# Patient Record
Sex: FEMALE | Race: WHITE | Employment: OTHER | URBAN - METROPOLITAN AREA
[De-identification: names, ages, dates, MRNs, and addresses within clinical notes are randomized per-mention and may not be internally consistent; named-entity substitution may affect disease eponyms.]

---

## 2020-01-01 ENCOUNTER — APPOINTMENT (OUTPATIENT)
Dept: CT IMAGING | Age: 85
DRG: 064 | End: 2020-01-01
Payer: MEDICARE

## 2020-01-01 ENCOUNTER — HOSPITAL ENCOUNTER (INPATIENT)
Age: 85
LOS: 3 days | Discharge: HOSPICE | DRG: 064 | End: 2020-02-22
Attending: EMERGENCY MEDICINE | Admitting: INTERNAL MEDICINE
Payer: MEDICARE

## 2020-01-01 ENCOUNTER — APPOINTMENT (OUTPATIENT)
Dept: GENERAL RADIOLOGY | Age: 85
DRG: 064 | End: 2020-01-01
Payer: MEDICARE

## 2020-01-01 VITALS
DIASTOLIC BLOOD PRESSURE: 74 MMHG | HEART RATE: 146 BPM | SYSTOLIC BLOOD PRESSURE: 106 MMHG | WEIGHT: 143.7 LBS | TEMPERATURE: 97.8 F | HEIGHT: 60 IN | OXYGEN SATURATION: 74 % | BODY MASS INDEX: 28.21 KG/M2 | RESPIRATION RATE: 28 BRPM

## 2020-01-01 LAB
A/G RATIO: 1.3 (ref 1.1–2.2)
ALBUMIN SERPL-MCNC: 4 G/DL (ref 3.4–5)
ALP BLD-CCNC: 105 U/L (ref 40–129)
ALT SERPL-CCNC: 33 U/L (ref 10–40)
ANION GAP SERPL CALCULATED.3IONS-SCNC: 15 MMOL/L (ref 3–16)
ANION GAP SERPL CALCULATED.3IONS-SCNC: 16 MMOL/L (ref 3–16)
ANION GAP SERPL CALCULATED.3IONS-SCNC: 29 MMOL/L (ref 3–16)
AST SERPL-CCNC: 80 U/L (ref 15–37)
BASOPHILS ABSOLUTE: 0 K/UL (ref 0–0.2)
BASOPHILS RELATIVE PERCENT: 0 %
BILIRUB SERPL-MCNC: 0.9 MG/DL (ref 0–1)
BILIRUBIN URINE: ABNORMAL
BLOOD CULTURE, ROUTINE: NORMAL
BLOOD, URINE: ABNORMAL
BUN BLDV-MCNC: 34 MG/DL (ref 7–20)
BUN BLDV-MCNC: 42 MG/DL (ref 7–20)
BUN BLDV-MCNC: 58 MG/DL (ref 7–20)
CALCIUM SERPL-MCNC: 8.1 MG/DL (ref 8.3–10.6)
CALCIUM SERPL-MCNC: 8.1 MG/DL (ref 8.3–10.6)
CALCIUM SERPL-MCNC: 9.8 MG/DL (ref 8.3–10.6)
CHLORIDE BLD-SCNC: 104 MMOL/L (ref 99–110)
CHLORIDE BLD-SCNC: 106 MMOL/L (ref 99–110)
CHLORIDE BLD-SCNC: 98 MMOL/L (ref 99–110)
CHOLESTEROL, TOTAL: 116 MG/DL (ref 0–199)
CLARITY: ABNORMAL
CO2: 15 MMOL/L (ref 21–32)
CO2: 17 MMOL/L (ref 21–32)
CO2: 19 MMOL/L (ref 21–32)
COLOR: ABNORMAL
CREAT SERPL-MCNC: 2 MG/DL (ref 0.6–1.2)
CREAT SERPL-MCNC: 2.6 MG/DL (ref 0.6–1.2)
CREAT SERPL-MCNC: 2.8 MG/DL (ref 0.6–1.2)
EKG ATRIAL RATE: 136 BPM
EKG DIAGNOSIS: NORMAL
EKG Q-T INTERVAL: 408 MS
EKG QRS DURATION: 92 MS
EKG QTC CALCULATION (BAZETT): 614 MS
EKG R AXIS: 87 DEGREES
EKG T AXIS: 170 DEGREES
EKG VENTRICULAR RATE: 136 BPM
EOSINOPHILS ABSOLUTE: 0 K/UL (ref 0–0.6)
EOSINOPHILS RELATIVE PERCENT: 0 %
EPITHELIAL CELLS, UA: ABNORMAL /HPF (ref 0–5)
GFR AFRICAN AMERICAN: 19
GFR AFRICAN AMERICAN: 21
GFR AFRICAN AMERICAN: 28
GFR NON-AFRICAN AMERICAN: 16
GFR NON-AFRICAN AMERICAN: 17
GFR NON-AFRICAN AMERICAN: 23
GLOBULIN: 3.2 G/DL
GLUCOSE BLD-MCNC: 124 MG/DL (ref 70–99)
GLUCOSE BLD-MCNC: 147 MG/DL (ref 70–99)
GLUCOSE BLD-MCNC: 149 MG/DL
GLUCOSE BLD-MCNC: 149 MG/DL (ref 70–99)
GLUCOSE BLD-MCNC: 153 MG/DL (ref 70–99)
GLUCOSE BLD-MCNC: 170 MG/DL (ref 70–99)
GLUCOSE BLD-MCNC: 173 MG/DL (ref 70–99)
GLUCOSE BLD-MCNC: 187 MG/DL (ref 70–99)
GLUCOSE BLD-MCNC: 96 MG/DL (ref 70–99)
GLUCOSE URINE: 100 MG/DL
HCT VFR BLD CALC: 39.1 % (ref 36–48)
HCT VFR BLD CALC: 44.8 % (ref 36–48)
HDLC SERPL-MCNC: 54 MG/DL (ref 40–60)
HEMOGLOBIN: 12.1 G/DL (ref 12–16)
HEMOGLOBIN: 12.8 G/DL (ref 12–16)
KETONES, URINE: 15 MG/DL
LACTIC ACID, SEPSIS: 10.4 MMOL/L (ref 0.4–1.9)
LACTIC ACID, SEPSIS: 9.6 MMOL/L (ref 0.4–1.9)
LDL CHOLESTEROL CALCULATED: 48 MG/DL
LEUKOCYTE ESTERASE, URINE: NEGATIVE
LYMPHOCYTES ABSOLUTE: 0.6 K/UL (ref 1–5.1)
LYMPHOCYTES RELATIVE PERCENT: 4.7 %
MCH RBC QN AUTO: 29.9 PG (ref 26–34)
MCH RBC QN AUTO: 30 PG (ref 26–34)
MCHC RBC AUTO-ENTMCNC: 28.6 G/DL (ref 31–36)
MCHC RBC AUTO-ENTMCNC: 30.9 G/DL (ref 31–36)
MCV RBC AUTO: 105 FL (ref 80–100)
MCV RBC AUTO: 96.6 FL (ref 80–100)
MICROSCOPIC EXAMINATION: YES
MONOCYTES ABSOLUTE: 0.9 K/UL (ref 0–1.3)
MONOCYTES RELATIVE PERCENT: 6.8 %
NEUTROPHILS ABSOLUTE: 11.4 K/UL (ref 1.7–7.7)
NEUTROPHILS RELATIVE PERCENT: 88.5 %
NITRITE, URINE: NEGATIVE
PDW BLD-RTO: 18.5 % (ref 12.4–15.4)
PDW BLD-RTO: 19.6 % (ref 12.4–15.4)
PERFORMED ON: ABNORMAL
PH UA: 5.5 (ref 5–8)
PLATELET # BLD: 209 K/UL (ref 135–450)
PLATELET # BLD: 274 K/UL (ref 135–450)
PMV BLD AUTO: 8.2 FL (ref 5–10.5)
PMV BLD AUTO: 8.2 FL (ref 5–10.5)
POTASSIUM REFLEX MAGNESIUM: 5.4 MMOL/L (ref 3.5–5.1)
POTASSIUM REFLEX MAGNESIUM: 6.2 MMOL/L (ref 3.5–5.1)
POTASSIUM SERPL-SCNC: 5.1 MMOL/L (ref 3.5–5.1)
PROTEIN UA: >=300 MG/DL
RBC # BLD: 4.05 M/UL (ref 4–5.2)
RBC # BLD: 4.26 M/UL (ref 4–5.2)
RBC UA: >100 /HPF (ref 0–4)
REPORT: NORMAL
SODIUM BLD-SCNC: 138 MMOL/L (ref 136–145)
SODIUM BLD-SCNC: 139 MMOL/L (ref 136–145)
SODIUM BLD-SCNC: 142 MMOL/L (ref 136–145)
SPECIFIC GRAVITY UA: >=1.03 (ref 1–1.03)
TOTAL PROTEIN: 7.2 G/DL (ref 6.4–8.2)
TRIGL SERPL-MCNC: 72 MG/DL (ref 0–150)
URINE CULTURE, ROUTINE: NORMAL
URINE REFLEX TO CULTURE: YES
URINE TYPE: ABNORMAL
UROBILINOGEN, URINE: 1 E.U./DL
VLDLC SERPL CALC-MCNC: 14 MG/DL
WBC # BLD: 12.8 K/UL (ref 4–11)
WBC # BLD: 14.7 K/UL (ref 4–11)
WBC UA: ABNORMAL /HPF (ref 0–5)

## 2020-01-01 PROCEDURE — 81001 URINALYSIS AUTO W/SCOPE: CPT

## 2020-01-01 PROCEDURE — 93005 ELECTROCARDIOGRAM TRACING: CPT | Performed by: EMERGENCY MEDICINE

## 2020-01-01 PROCEDURE — 96361 HYDRATE IV INFUSION ADD-ON: CPT

## 2020-01-01 PROCEDURE — 1200000000 HC SEMI PRIVATE

## 2020-01-01 PROCEDURE — 6370000000 HC RX 637 (ALT 250 FOR IP): Performed by: EMERGENCY MEDICINE

## 2020-01-01 PROCEDURE — 87150 DNA/RNA AMPLIFIED PROBE: CPT

## 2020-01-01 PROCEDURE — 51702 INSERT TEMP BLADDER CATH: CPT

## 2020-01-01 PROCEDURE — 36415 COLL VENOUS BLD VENIPUNCTURE: CPT

## 2020-01-01 PROCEDURE — 2700000000 HC OXYGEN THERAPY PER DAY

## 2020-01-01 PROCEDURE — 1250000000 HC SEMI PRIVATE HOSPICE R&B

## 2020-01-01 PROCEDURE — 6370000000 HC RX 637 (ALT 250 FOR IP): Performed by: INTERNAL MEDICINE

## 2020-01-01 PROCEDURE — 2580000003 HC RX 258: Performed by: INTERNAL MEDICINE

## 2020-01-01 PROCEDURE — 87040 BLOOD CULTURE FOR BACTERIA: CPT

## 2020-01-01 PROCEDURE — 96365 THER/PROPH/DIAG IV INF INIT: CPT

## 2020-01-01 PROCEDURE — 6360000002 HC RX W HCPCS: Performed by: INTERNAL MEDICINE

## 2020-01-01 PROCEDURE — 80048 BASIC METABOLIC PNL TOTAL CA: CPT

## 2020-01-01 PROCEDURE — 6360000002 HC RX W HCPCS: Performed by: EMERGENCY MEDICINE

## 2020-01-01 PROCEDURE — 94761 N-INVAS EAR/PLS OXIMETRY MLT: CPT

## 2020-01-01 PROCEDURE — 99291 CRITICAL CARE FIRST HOUR: CPT

## 2020-01-01 PROCEDURE — 93010 ELECTROCARDIOGRAM REPORT: CPT | Performed by: INTERNAL MEDICINE

## 2020-01-01 PROCEDURE — 2580000003 HC RX 258: Performed by: EMERGENCY MEDICINE

## 2020-01-01 PROCEDURE — 70450 CT HEAD/BRAIN W/O DYE: CPT

## 2020-01-01 PROCEDURE — 96375 TX/PRO/DX INJ NEW DRUG ADDON: CPT

## 2020-01-01 PROCEDURE — 99223 1ST HOSP IP/OBS HIGH 75: CPT | Performed by: PSYCHIATRY & NEUROLOGY

## 2020-01-01 PROCEDURE — 80053 COMPREHEN METABOLIC PANEL: CPT

## 2020-01-01 PROCEDURE — 71250 CT THORAX DX C-: CPT

## 2020-01-01 PROCEDURE — 85025 COMPLETE CBC W/AUTO DIFF WBC: CPT

## 2020-01-01 PROCEDURE — 83605 ASSAY OF LACTIC ACID: CPT

## 2020-01-01 PROCEDURE — 99292 CRITICAL CARE ADDL 30 MIN: CPT

## 2020-01-01 PROCEDURE — 85027 COMPLETE CBC AUTOMATED: CPT

## 2020-01-01 PROCEDURE — 71045 X-RAY EXAM CHEST 1 VIEW: CPT

## 2020-01-01 PROCEDURE — 80061 LIPID PANEL: CPT

## 2020-01-01 PROCEDURE — 87086 URINE CULTURE/COLONY COUNT: CPT

## 2020-01-01 PROCEDURE — 96367 TX/PROPH/DG ADDL SEQ IV INF: CPT

## 2020-01-01 RX ORDER — ATORVASTATIN CALCIUM 40 MG/1
40 TABLET, FILM COATED ORAL NIGHTLY
Status: DISCONTINUED | OUTPATIENT
Start: 2020-01-01 | End: 2020-01-01

## 2020-01-01 RX ORDER — LORAZEPAM 2 MG/ML
0.5 CONCENTRATE ORAL EVERY 4 HOURS PRN
Status: DISCONTINUED | OUTPATIENT
Start: 2020-01-01 | End: 2020-01-01

## 2020-01-01 RX ORDER — CETIRIZINE HYDROCHLORIDE 10 MG/1
10 TABLET ORAL DAILY
Status: ON HOLD | COMMUNITY
End: 2020-01-01 | Stop reason: HOSPADM

## 2020-01-01 RX ORDER — ONDANSETRON 4 MG/1
4 TABLET, ORALLY DISINTEGRATING ORAL ONCE
Status: DISCONTINUED | OUTPATIENT
Start: 2020-01-01 | End: 2020-01-01 | Stop reason: HOSPADM

## 2020-01-01 RX ORDER — ACETAMINOPHEN 500 MG
500 TABLET ORAL EVERY 6 HOURS PRN
Status: ON HOLD | COMMUNITY
End: 2020-01-01 | Stop reason: HOSPADM

## 2020-01-01 RX ORDER — GABAPENTIN 300 MG/1
300 CAPSULE ORAL 3 TIMES DAILY
Status: ON HOLD | COMMUNITY
End: 2020-01-01 | Stop reason: HOSPADM

## 2020-01-01 RX ORDER — ESCITALOPRAM OXALATE 10 MG/1
10 TABLET ORAL DAILY
Status: ON HOLD | COMMUNITY
End: 2020-01-01 | Stop reason: HOSPADM

## 2020-01-01 RX ORDER — SODIUM CHLORIDE 0.9 % (FLUSH) 0.9 %
10 SYRINGE (ML) INJECTION PRN
Status: DISCONTINUED | OUTPATIENT
Start: 2020-01-01 | End: 2020-01-01

## 2020-01-01 RX ORDER — LABETALOL HYDROCHLORIDE 5 MG/ML
10 INJECTION, SOLUTION INTRAVENOUS EVERY 10 MIN PRN
Status: DISCONTINUED | OUTPATIENT
Start: 2020-01-01 | End: 2020-01-01

## 2020-01-01 RX ORDER — SENNA PLUS 8.6 MG/1
1 TABLET ORAL 2 TIMES DAILY PRN
Status: ON HOLD | COMMUNITY
End: 2020-01-01 | Stop reason: HOSPADM

## 2020-01-01 RX ORDER — 0.9 % SODIUM CHLORIDE 0.9 %
500 INTRAVENOUS SOLUTION INTRAVENOUS ONCE
Status: COMPLETED | OUTPATIENT
Start: 2020-01-01 | End: 2020-01-01

## 2020-01-01 RX ORDER — 0.9 % SODIUM CHLORIDE 0.9 %
1000 INTRAVENOUS SOLUTION INTRAVENOUS ONCE
Status: COMPLETED | OUTPATIENT
Start: 2020-01-01 | End: 2020-01-01

## 2020-01-01 RX ORDER — ESCITALOPRAM OXALATE 10 MG/1
10 TABLET ORAL DAILY
Status: DISCONTINUED | OUTPATIENT
Start: 2020-01-01 | End: 2020-01-01

## 2020-01-01 RX ORDER — DEXTROSE MONOHYDRATE 25 G/50ML
25 INJECTION, SOLUTION INTRAVENOUS ONCE
Status: COMPLETED | OUTPATIENT
Start: 2020-01-01 | End: 2020-01-01

## 2020-01-01 RX ORDER — MAGNESIUM HYDROXIDE/ALUMINUM HYDROXICE/SIMETHICONE 120; 1200; 1200 MG/30ML; MG/30ML; MG/30ML
30 SUSPENSION ORAL EVERY 4 HOURS PRN
Status: ON HOLD | COMMUNITY
End: 2020-01-01 | Stop reason: HOSPADM

## 2020-01-01 RX ORDER — TRAZODONE HYDROCHLORIDE 50 MG/1
50 TABLET ORAL NIGHTLY
Status: DISCONTINUED | OUTPATIENT
Start: 2020-01-01 | End: 2020-01-01

## 2020-01-01 RX ORDER — MAGNESIUM HYDROXIDE/ALUMINUM HYDROXICE/SIMETHICONE 120; 1200; 1200 MG/30ML; MG/30ML; MG/30ML
30 SUSPENSION ORAL EVERY 4 HOURS PRN
Status: DISCONTINUED | OUTPATIENT
Start: 2020-01-01 | End: 2020-01-01

## 2020-01-01 RX ORDER — CETIRIZINE HYDROCHLORIDE 10 MG/1
10 TABLET ORAL DAILY
Status: DISCONTINUED | OUTPATIENT
Start: 2020-01-01 | End: 2020-01-01

## 2020-01-01 RX ORDER — SODIUM CHLORIDE 0.9 % (FLUSH) 0.9 %
10 SYRINGE (ML) INJECTION EVERY 12 HOURS SCHEDULED
Status: DISCONTINUED | OUTPATIENT
Start: 2020-01-01 | End: 2020-01-01

## 2020-01-01 RX ORDER — DEXTROSE AND SODIUM CHLORIDE 5; .45 G/100ML; G/100ML
INJECTION, SOLUTION INTRAVENOUS CONTINUOUS
Status: DISCONTINUED | OUTPATIENT
Start: 2020-01-01 | End: 2020-01-01

## 2020-01-01 RX ORDER — DEXTROSE MONOHYDRATE 25 G/50ML
12.5 INJECTION, SOLUTION INTRAVENOUS PRN
Status: DISCONTINUED | OUTPATIENT
Start: 2020-01-01 | End: 2020-01-01 | Stop reason: HOSPADM

## 2020-01-01 RX ORDER — ONDANSETRON 2 MG/ML
4 INJECTION INTRAMUSCULAR; INTRAVENOUS EVERY 6 HOURS PRN
Status: DISCONTINUED | OUTPATIENT
Start: 2020-01-01 | End: 2020-01-01 | Stop reason: HOSPADM

## 2020-01-01 RX ORDER — IBUPROFEN 200 MG
200 TABLET ORAL EVERY 4 HOURS PRN
Status: ON HOLD | COMMUNITY
End: 2020-01-01 | Stop reason: HOSPADM

## 2020-01-01 RX ORDER — IBUPROFEN 200 MG
200 TABLET ORAL EVERY 4 HOURS PRN
Status: DISCONTINUED | OUTPATIENT
Start: 2020-01-01 | End: 2020-01-01

## 2020-01-01 RX ORDER — MORPHINE SULFATE 20 MG/ML
10 SOLUTION ORAL
Status: DISCONTINUED | OUTPATIENT
Start: 2020-01-01 | End: 2020-01-01 | Stop reason: HOSPADM

## 2020-01-01 RX ORDER — ASPIRIN 81 MG/1
81 TABLET, CHEWABLE ORAL DAILY
Status: DISCONTINUED | OUTPATIENT
Start: 2020-01-01 | End: 2020-01-01

## 2020-01-01 RX ORDER — OXYCODONE AND ACETAMINOPHEN 10; 325 MG/1; MG/1
1 TABLET ORAL 3 TIMES DAILY
Status: ON HOLD | COMMUNITY
End: 2020-01-01 | Stop reason: HOSPADM

## 2020-01-01 RX ORDER — SENNA PLUS 8.6 MG/1
1 TABLET ORAL NIGHTLY
Status: DISCONTINUED | OUTPATIENT
Start: 2020-01-01 | End: 2020-01-01

## 2020-01-01 RX ORDER — ASPIRIN 81 MG/1
81 TABLET ORAL DAILY
Status: DISCONTINUED | OUTPATIENT
Start: 2020-01-01 | End: 2020-01-01 | Stop reason: ALTCHOICE

## 2020-01-01 RX ORDER — MIRTAZAPINE 7.5 MG/1
7.5 TABLET, FILM COATED ORAL NIGHTLY
Status: ON HOLD | COMMUNITY
End: 2020-01-01 | Stop reason: HOSPADM

## 2020-01-01 RX ORDER — ACETAMINOPHEN 500 MG
500 TABLET ORAL EVERY 6 HOURS PRN
Status: DISCONTINUED | OUTPATIENT
Start: 2020-01-01 | End: 2020-01-01

## 2020-01-01 RX ORDER — ASPIRIN 300 MG/1
300 SUPPOSITORY RECTAL DAILY
Status: DISCONTINUED | OUTPATIENT
Start: 2020-01-01 | End: 2020-01-01 | Stop reason: HOSPADM

## 2020-01-01 RX ORDER — PROMETHAZINE HYDROCHLORIDE 25 MG/1
12.5 TABLET ORAL EVERY 6 HOURS PRN
Status: DISCONTINUED | OUTPATIENT
Start: 2020-01-01 | End: 2020-01-01

## 2020-01-01 RX ORDER — OXYCODONE AND ACETAMINOPHEN 10; 325 MG/1; MG/1
0.5 TABLET ORAL 3 TIMES DAILY
Status: DISCONTINUED | OUTPATIENT
Start: 2020-01-01 | End: 2020-01-01

## 2020-01-01 RX ORDER — BISACODYL 10 MG
10 SUPPOSITORY, RECTAL RECTAL DAILY PRN
Status: DISCONTINUED | OUTPATIENT
Start: 2020-01-01 | End: 2020-01-01 | Stop reason: HOSPADM

## 2020-01-01 RX ORDER — FENTANYL 25 UG/H
1 PATCH TRANSDERMAL
Status: DISCONTINUED | OUTPATIENT
Start: 2020-01-01 | End: 2020-01-01

## 2020-01-01 RX ORDER — CLONAZEPAM 0.5 MG/1
0.5 TABLET ORAL NIGHTLY
Status: DISCONTINUED | OUTPATIENT
Start: 2020-01-01 | End: 2020-01-01

## 2020-01-01 RX ORDER — BUPROPION HYDROCHLORIDE 300 MG/1
300 TABLET ORAL EVERY MORNING
Status: DISCONTINUED | OUTPATIENT
Start: 2020-01-01 | End: 2020-01-01

## 2020-01-01 RX ORDER — SODIUM CHLORIDE 9 MG/ML
INJECTION, SOLUTION INTRAVENOUS CONTINUOUS
Status: DISCONTINUED | OUTPATIENT
Start: 2020-01-01 | End: 2020-01-01

## 2020-01-01 RX ORDER — POLYETHYLENE GLYCOL 3350 17 G/17G
17 POWDER, FOR SOLUTION ORAL DAILY PRN
Status: DISCONTINUED | OUTPATIENT
Start: 2020-01-01 | End: 2020-01-01

## 2020-01-01 RX ORDER — DEXTROSE MONOHYDRATE 50 MG/ML
100 INJECTION, SOLUTION INTRAVENOUS PRN
Status: DISCONTINUED | OUTPATIENT
Start: 2020-01-01 | End: 2020-01-01 | Stop reason: HOSPADM

## 2020-01-01 RX ORDER — MORPHINE SULFATE 20 MG/ML
10 SOLUTION ORAL
Qty: 30 ML | Refills: 0 | Status: SHIPPED | OUTPATIENT
Start: 2020-01-01 | End: 2020-02-25

## 2020-01-01 RX ORDER — FENTANYL 25 UG/H
1 PATCH TRANSDERMAL
Status: ON HOLD | COMMUNITY
End: 2020-01-01 | Stop reason: HOSPADM

## 2020-01-01 RX ORDER — BISACODYL 10 MG
10 SUPPOSITORY, RECTAL RECTAL DAILY PRN
COMMUNITY

## 2020-01-01 RX ORDER — MENTHOL AND ZINC OXIDE .44; 20.625 G/100G; G/100G
OINTMENT TOPICAL 2 TIMES DAILY PRN
Status: ON HOLD | COMMUNITY
End: 2020-01-01 | Stop reason: HOSPADM

## 2020-01-01 RX ORDER — LORAZEPAM 2 MG/ML
2 CONCENTRATE ORAL EVERY 4 HOURS PRN
Status: DISCONTINUED | OUTPATIENT
Start: 2020-01-01 | End: 2020-01-01 | Stop reason: HOSPADM

## 2020-01-01 RX ORDER — NICOTINE POLACRILEX 4 MG
15 LOZENGE BUCCAL PRN
Status: DISCONTINUED | OUTPATIENT
Start: 2020-01-01 | End: 2020-01-01 | Stop reason: HOSPADM

## 2020-01-01 RX ORDER — MIRTAZAPINE 15 MG/1
7.5 TABLET, FILM COATED ORAL NIGHTLY
Status: DISCONTINUED | OUTPATIENT
Start: 2020-01-01 | End: 2020-01-01

## 2020-01-01 RX ORDER — MORPHINE SULFATE 20 MG/ML
5 SOLUTION ORAL
Status: DISCONTINUED | OUTPATIENT
Start: 2020-01-01 | End: 2020-01-01

## 2020-01-01 RX ORDER — LORAZEPAM 2 MG/ML
2 CONCENTRATE ORAL EVERY 4 HOURS PRN
Qty: 30 ML | Refills: 0 | Status: SHIPPED | OUTPATIENT
Start: 2020-01-01 | End: 2020-03-07

## 2020-01-01 RX ADMIN — SODIUM CHLORIDE 500 ML: 9 INJECTION, SOLUTION INTRAVENOUS at 15:15

## 2020-01-01 RX ADMIN — Medication 2 MG: at 19:13

## 2020-01-01 RX ADMIN — SODIUM CHLORIDE 500 ML: 9 INJECTION, SOLUTION INTRAVENOUS at 17:37

## 2020-01-01 RX ADMIN — Medication 10 ML: at 21:50

## 2020-01-01 RX ADMIN — INSULIN HUMAN 10 UNITS: 100 INJECTION, SOLUTION PARENTERAL at 17:33

## 2020-01-01 RX ADMIN — DEXTROSE AND SODIUM CHLORIDE: 5; 450 INJECTION, SOLUTION INTRAVENOUS at 03:26

## 2020-01-01 RX ADMIN — PIPERACILLIN AND TAZOBACTAM 3.38 G: 3; .375 INJECTION, POWDER, LYOPHILIZED, FOR SOLUTION INTRAVENOUS at 21:48

## 2020-01-01 RX ADMIN — Medication 10 ML: at 08:41

## 2020-01-01 RX ADMIN — MORPHINE SULFATE 5 MG: 100 SOLUTION ORAL at 01:33

## 2020-01-01 RX ADMIN — PIPERACILLIN AND TAZOBACTAM 3.38 G: 3; .375 INJECTION, POWDER, LYOPHILIZED, FOR SOLUTION INTRAVENOUS at 08:57

## 2020-01-01 RX ADMIN — MORPHINE SULFATE 10 MG: 100 SOLUTION ORAL at 04:17

## 2020-01-01 RX ADMIN — DEXTROSE MONOHYDRATE 25 G: 500 INJECTION PARENTERAL at 17:32

## 2020-01-01 RX ADMIN — Medication 10 ML: at 21:48

## 2020-01-01 RX ADMIN — Medication 10 ML: at 23:33

## 2020-01-01 RX ADMIN — Medication 2 MG: at 23:39

## 2020-01-01 RX ADMIN — ASPIRIN 300 MG: 300 SUPPOSITORY RECTAL at 08:41

## 2020-01-01 RX ADMIN — SODIUM CHLORIDE 1000 ML: 9 INJECTION, SOLUTION INTRAVENOUS at 23:06

## 2020-01-01 RX ADMIN — TAZOBACTAM SODIUM AND PIPERACILLIN SODIUM 4.5 G: 500; 4 INJECTION, SOLUTION INTRAVENOUS at 11:30

## 2020-01-01 RX ADMIN — PIPERACILLIN AND TAZOBACTAM 3.38 G: 3; .375 INJECTION, POWDER, LYOPHILIZED, FOR SOLUTION INTRAVENOUS at 22:08

## 2020-01-01 RX ADMIN — MORPHINE SULFATE 10 MG: 100 SOLUTION ORAL at 15:02

## 2020-01-01 RX ADMIN — DEXTROSE AND SODIUM CHLORIDE: 5; 450 INJECTION, SOLUTION INTRAVENOUS at 12:11

## 2020-01-01 RX ADMIN — MORPHINE SULFATE 10 MG: 100 SOLUTION ORAL at 10:32

## 2020-01-01 RX ADMIN — MORPHINE SULFATE 10 MG: 100 SOLUTION ORAL at 22:23

## 2020-01-01 RX ADMIN — SODIUM CHLORIDE: 9 INJECTION, SOLUTION INTRAVENOUS at 09:25

## 2020-01-01 RX ADMIN — Medication 10 ML: at 23:34

## 2020-01-01 RX ADMIN — CEFEPIME HYDROCHLORIDE 2 G: 2 INJECTION, POWDER, FOR SOLUTION INTRAVENOUS at 16:15

## 2020-01-01 RX ADMIN — MORPHINE SULFATE 5 MG: 100 SOLUTION ORAL at 14:13

## 2020-01-01 RX ADMIN — SODIUM CHLORIDE: 9 INJECTION, SOLUTION INTRAVENOUS at 01:33

## 2020-01-01 RX ADMIN — MORPHINE SULFATE 5 MG: 100 SOLUTION ORAL at 21:09

## 2020-01-01 RX ADMIN — DEXTROSE AND SODIUM CHLORIDE: 5; 450 INJECTION, SOLUTION INTRAVENOUS at 05:07

## 2020-01-01 RX ADMIN — MORPHINE SULFATE 10 MG: 100 SOLUTION ORAL at 15:34

## 2020-01-01 RX ADMIN — Medication 2 MG: at 05:44

## 2020-01-01 RX ADMIN — Medication 10 ML: at 21:00

## 2020-01-01 RX ADMIN — DEXTROSE AND SODIUM CHLORIDE: 5; 450 INJECTION, SOLUTION INTRAVENOUS at 19:33

## 2020-01-01 RX ADMIN — MORPHINE SULFATE 5 MG: 100 SOLUTION ORAL at 07:04

## 2020-01-01 RX ADMIN — Medication 10 ML: at 09:20

## 2020-01-01 RX ADMIN — PIPERACILLIN AND TAZOBACTAM 3.38 G: 3; .375 INJECTION, POWDER, LYOPHILIZED, FOR SOLUTION INTRAVENOUS at 08:41

## 2020-01-01 RX ADMIN — TAZOBACTAM SODIUM AND PIPERACILLIN SODIUM 4.5 G: 500; 4 INJECTION, SOLUTION INTRAVENOUS at 23:07

## 2020-01-01 RX ADMIN — MORPHINE SULFATE 10 MG: 100 SOLUTION ORAL at 22:22

## 2020-01-01 RX ADMIN — DEXTROSE AND SODIUM CHLORIDE: 5; 450 INJECTION, SOLUTION INTRAVENOUS at 21:01

## 2020-01-01 RX ADMIN — ASPIRIN 300 MG: 300 SUPPOSITORY RECTAL at 09:20

## 2020-01-01 RX ADMIN — VANCOMYCIN HYDROCHLORIDE 750 MG: 750 INJECTION, POWDER, LYOPHILIZED, FOR SOLUTION INTRAVENOUS at 16:58

## 2020-01-01 RX ADMIN — SODIUM CHLORIDE 500 ML: 9 INJECTION, SOLUTION INTRAVENOUS at 14:25

## 2020-01-01 ASSESSMENT — PAIN SCALES - PAIN ASSESSMENT IN ADVANCED DEMENTIA (PAINAD)
BREATHING: 2
CONSOLABILITY: 0
CONSOLABILITY: 0
BODYLANGUAGE: 1
FACIALEXPRESSION: 0
BODYLANGUAGE: 0
CONSOLABILITY: 0
BODYLANGUAGE: 0
NEGVOCALIZATION: 0
NEGVOCALIZATION: 0
FACIALEXPRESSION: 0
FACIALEXPRESSION: 2
FACIALEXPRESSION: 0
TOTALSCORE: 2
BREATHING: 2
NEGVOCALIZATION: 0
BREATHING: 0
BREATHING: 0
FACIALEXPRESSION: 0
BODYLANGUAGE: 0
BODYLANGUAGE: 0
BREATHING: 2
FACIALEXPRESSION: 0
FACIALEXPRESSION: 0
CONSOLABILITY: 0
CONSOLABILITY: 0
BREATHING: 2
BREATHING: 2
NEGVOCALIZATION: 0
TOTALSCORE: 2
FACIALEXPRESSION: 0
BODYLANGUAGE: 0
BODYLANGUAGE: 0
FACIALEXPRESSION: 0
TOTALSCORE: 2
BODYLANGUAGE: 0
CONSOLABILITY: 0
FACIALEXPRESSION: 0
BREATHING: 2
TOTALSCORE: 2
FACIALEXPRESSION: 2
TOTALSCORE: 2
NEGVOCALIZATION: 0
TOTALSCORE: 6
BREATHING: 1
TOTALSCORE: 2
TOTALSCORE: 2
BODYLANGUAGE: 0
CONSOLABILITY: 0
CONSOLABILITY: 0
TOTALSCORE: 6
FACIALEXPRESSION: 0
FACIALEXPRESSION: 0
NEGVOCALIZATION: 0
FACIALEXPRESSION: 0
NEGVOCALIZATION: 0
BREATHING: 2
TOTALSCORE: 1
FACIALEXPRESSION: 0
TOTALSCORE: 2
NEGVOCALIZATION: 0
TOTALSCORE: 2
TOTALSCORE: 1
FACIALEXPRESSION: 0
NEGVOCALIZATION: 0
TOTALSCORE: 2
BREATHING: 1
CONSOLABILITY: 0
BREATHING: 1
TOTALSCORE: 2
NEGVOCALIZATION: 0
BREATHING: 0
NEGVOCALIZATION: 0
BODYLANGUAGE: 0
CONSOLABILITY: 0
NEGVOCALIZATION: 0
CONSOLABILITY: 1
CONSOLABILITY: 0
NEGVOCALIZATION: 0
NEGVOCALIZATION: 0
TOTALSCORE: 0
CONSOLABILITY: 0
BODYLANGUAGE: 0
BREATHING: 2
BODYLANGUAGE: 0
TOTALSCORE: 2
CONSOLABILITY: 0
NEGVOCALIZATION: 0
BODYLANGUAGE: 0
FACIALEXPRESSION: 0
TOTALSCORE: 2
BREATHING: 2
BREATHING: 2
BREATHING: 1
BREATHING: 2
CONSOLABILITY: 0
FACIALEXPRESSION: 0
BODYLANGUAGE: 1
BODYLANGUAGE: 0
TOTALSCORE: 4
BREATHING: 0
BREATHING: 2
NEGVOCALIZATION: 0
BODYLANGUAGE: 1
NEGVOCALIZATION: 1
BODYLANGUAGE: 0
NEGVOCALIZATION: 0
FACIALEXPRESSION: 1
FACIALEXPRESSION: 0
FACIALEXPRESSION: 2
CONSOLABILITY: 0
NEGVOCALIZATION: 0
BREATHING: 1
NEGVOCALIZATION: 0
BREATHING: 2
FACIALEXPRESSION: 0
CONSOLABILITY: 0
BREATHING: 1
NEGVOCALIZATION: 0
BODYLANGUAGE: 0
CONSOLABILITY: 0
BODYLANGUAGE: 0
BREATHING: 2
TOTALSCORE: 4
CONSOLABILITY: 0
FACIALEXPRESSION: 0
BODYLANGUAGE: 0
NEGVOCALIZATION: 0
FACIALEXPRESSION: 0
TOTALSCORE: 2
FACIALEXPRESSION: 0
CONSOLABILITY: 0
CONSOLABILITY: 0
CONSOLABILITY: 1
BODYLANGUAGE: 0
CONSOLABILITY: 0
FACIALEXPRESSION: 0
BREATHING: 2
TOTALSCORE: 2
CONSOLABILITY: 0
BODYLANGUAGE: 1
BREATHING: 2
NEGVOCALIZATION: 0
TOTALSCORE: 0
CONSOLABILITY: 0
NEGVOCALIZATION: 0
TOTALSCORE: 0
TOTALSCORE: 1
TOTALSCORE: 2
NEGVOCALIZATION: 0
BREATHING: 2
FACIALEXPRESSION: 0
CONSOLABILITY: 0
NEGVOCALIZATION: 0
CONSOLABILITY: 0
TOTALSCORE: 1
TOTALSCORE: 2
NEGVOCALIZATION: 1
BODYLANGUAGE: 0
BODYLANGUAGE: 0
CONSOLABILITY: 0
BREATHING: 2
FACIALEXPRESSION: 1
BODYLANGUAGE: 0
CONSOLABILITY: 0
NEGVOCALIZATION: 0
TOTALSCORE: 2
BREATHING: 2
FACIALEXPRESSION: 0
CONSOLABILITY: 0
TOTALSCORE: 4
BREATHING: 2
BREATHING: 1
CONSOLABILITY: 0
FACIALEXPRESSION: 0
BREATHING: 0
NEGVOCALIZATION: 0
FACIALEXPRESSION: 0
CONSOLABILITY: 0
BODYLANGUAGE: 0
BODYLANGUAGE: 0
TOTALSCORE: 0
BREATHING: 1
TOTALSCORE: 2
TOTALSCORE: 2
BREATHING: 2
NEGVOCALIZATION: 0
TOTALSCORE: 2
TOTALSCORE: 0
CONSOLABILITY: 0
NEGVOCALIZATION: 0
BODYLANGUAGE: 0
FACIALEXPRESSION: 0
TOTALSCORE: 2
BODYLANGUAGE: 1
CONSOLABILITY: 0
TOTALSCORE: 2
FACIALEXPRESSION: 0
BODYLANGUAGE: 0
CONSOLABILITY: 0
FACIALEXPRESSION: 0
BREATHING: 1
FACIALEXPRESSION: 0
BREATHING: 2
NEGVOCALIZATION: 1
NEGVOCALIZATION: 0
BODYLANGUAGE: 0
BREATHING: 2
CONSOLABILITY: 0
BODYLANGUAGE: 0
NEGVOCALIZATION: 0
BODYLANGUAGE: 1
FACIALEXPRESSION: 0
NEGVOCALIZATION: 0
CONSOLABILITY: 0
NEGVOCALIZATION: 0
BODYLANGUAGE: 1
FACIALEXPRESSION: 0
BODYLANGUAGE: 0
TOTALSCORE: 2
FACIALEXPRESSION: 0
BREATHING: 2
NEGVOCALIZATION: 1
TOTALSCORE: 2
FACIALEXPRESSION: 0
NEGVOCALIZATION: 0
CONSOLABILITY: 0
BODYLANGUAGE: 0
NEGVOCALIZATION: 0
CONSOLABILITY: 0
BREATHING: 0

## 2020-01-01 ASSESSMENT — PAIN SCALES - GENERAL
PAINLEVEL_OUTOF10: 6
PAINLEVEL_OUTOF10: 1
PAINLEVEL_OUTOF10: 2
PAINLEVEL_OUTOF10: 2
PAINLEVEL_OUTOF10: 1
PAINLEVEL_OUTOF10: 2
PAINLEVEL_OUTOF10: 4
PAINLEVEL_OUTOF10: 1
PAINLEVEL_OUTOF10: 4
PAINLEVEL_OUTOF10: 2
PAINLEVEL_OUTOF10: 4
PAINLEVEL_OUTOF10: 6
PAINLEVEL_OUTOF10: 2
PAINLEVEL_OUTOF10: 4
PAINLEVEL_OUTOF10: 4
PAINLEVEL_OUTOF10: 2

## 2020-02-19 PROBLEM — I63.9 ACUTE CEREBRAL INFARCTION (HCC): Status: ACTIVE | Noted: 2020-01-01

## 2020-02-19 NOTE — ED PROVIDER NOTES
[address] PROVIDER NOTE    Patient Identification  Pt Name: Wilbur Alvarez  MRN: [id number]  [place] 6/26/1930  Date of evaluation: 2/19/2020  Provider: Steve Marin MD  PCP: Milla Suazo MD    Chief Complaint  Altered Mental Status (Pt. brought in by [facility] from [facility]. Pt. last known well was sleeping at 0900 per nursing home so bedtime last night. EMS brought signed DNR from nursing home. Pt. had fetanyl patch on left side Brenda Grissom RN removed. Patient winces in pain with left extremity touch.  )      HPI  History provided by patient   This is a 80 y.o. female who presents to the ED for altered mental status. Last known well was 9 PM last night. Patient yesterday was talking and moving all extremities. This morning, patient was found to be unresponsive and therefore was brought to the hospital.  Blood sugar normal per EMS. Patient unable to give any history. Did speak with daughter rajesh Ch who states that she did fall about a week ago. I did confirm that she is DNR comfort care. I went over the plan with her daughter who wished for her to have things such as a CAT scan, blood work, and urine obtained. .     ROS  Unable to obtain    I have reviewed the following nursing documentation:  Allergies: Patient has no known allergies.     Past medical history:   Past Medical History:   Diagnosis Date    Alcoholism ([address]     recovered    Anemia     Depression     HTN (hypertension)     Osteoarthritis      Past surgical history:   Past Surgical History:   Procedure Laterality Date    CATARACT REMOVAL Bilateral 2012    TOTAL HIP ARTHROPLASTY Left 11/10/2008    TOTAL HIP ARTHROPLASTY Right 3/25/2013    Dr Sonam Pardo medications:   Previous Medications    ACETAMINOPHEN (TYLENOL) 500 MG TABLET    Take 500 mg by mouth every 6 hours as needed for Pain    ALUMINUM & MAGNESIUM HYDROXIDE-SIMETHICONE (MAALOX) [id number] MG/5ML SUSP SUSPENSION JONES on 2/19/2020 at 15:30.          CT ABDOMEN PELVIS WO CONTRAST Additional Contrast? None    (Results Pending)       Labs  Results for orders placed or performed during the hospital encounter of 02/19/20   CBC Auto Differential   Result Value Ref Range    WBC 12.8 (H) 4.0 - 11.0 K/uL    RBC 4.26 4.00 - 5.20 M/uL    Hemoglobin 12.8 12.0 - 16.0 g/dL    Hematocrit 44.8 36.0 - 48.0 %    .0 (H) 80.0 - 100.0 fL    MCH 30.0 26.0 - 34.0 pg    MCHC 28.6 (L) 31.0 - 36.0 g/dL    RDW 19.6 (H) 12.4 - 15.4 %    Platelets 316 012 - 150 K/uL    MPV 8.2 5.0 - 10.5 fL    Neutrophils % 88.5 %    Lymphocytes % 4.7 %    Monocytes % 6.8 %    Eosinophils % 0.0 %    Basophils % 0.0 %    Neutrophils Absolute 11.4 (H) 1.7 - 7.7 K/uL    Lymphocytes Absolute 0.6 (L) 1.0 - 5.1 K/uL    Monocytes Absolute 0.9 0.0 - 1.3 K/uL    Eosinophils Absolute 0.0 0.0 - 0.6 K/uL    Basophils Absolute 0.0 0.0 - 0.2 K/uL   Comprehensive Metabolic Panel w/ Reflex to MG   Result Value Ref Range    Sodium 142 136 - 145 mmol/L    Potassium reflex Magnesium 6.2 (HH) 3.5 - 5.1 mmol/L    Chloride 98 (L) 99 - 110 mmol/L    CO2 15 (L) 21 - 32 mmol/L    Anion Gap 29 (H) 3 - 16    Glucose 96 70 - 99 mg/dL    BUN 34 (H) 7 - 20 mg/dL    CREATININE 2.0 (H) 0.6 - 1.2 mg/dL    GFR Non-African American 23 (A) >60    GFR  28 (A) >60    Calcium 9.8 8.3 - 10.6 mg/dL    Total Protein 7.2 6.4 - 8.2 g/dL    Alb 4.0 3.4 - 5.0 g/dL    Albumin/Globulin Ratio 1.3 1.1 - 2.2    Total Bilirubin 0.9 0.0 - 1.0 mg/dL    Alkaline Phosphatase 105 40 - 129 U/L    ALT 33 10 - 40 U/L    AST 80 (H) 15 - 37 U/L    Globulin 3.2 g/dL   Lactate, Sepsis   Result Value Ref Range    Lactic Acid, Sepsis 10.4 (HH) 0.4 - 1.9 mmol/L   Lactate, Sepsis   Result Value Ref Range    Lactic Acid, Sepsis 9.6 (HH) 0.4 - 1.9 mmol/L   POCT Glucose   Result Value Ref Range    Glucose 149 mg/dL   POCT Glucose   Result Value Ref Range    POC Glucose 149 (H) 70 - 99 mg/dl    Performed on ACCU-CHEK MG TABLET    Take 15 mg by mouth daily. NORTRIPTYLINE HCL (NORTRIPYTLINE HCL PO)    Take 25 mg by mouth 2 times daily. 2 every night at bedtime    OXYCODONE-ACETAMINOPHEN (PERCOCET) 5-325 MG PER TABLET    Take 1 tablet by mouth every 6 hours as needed for Pain. This chart was generated using the [address] [place] [address] [address] dictation system. I created this record but it may contain dictation errors given the limitations of this technology.         You Serna MD  02/19/20 2030

## 2020-02-20 NOTE — PROGRESS NOTES
Dr. García Lantigua to see if he wants patient to still have carotid dopplers and CT abdomen that was ordered in ED.

## 2020-02-20 NOTE — PROGRESS NOTES
Reassessment complete. Remains minimally responsive. Will briefly open eyes to voice but immediately closes eyes. On 15 L non-rebreather. O2 saturations 88-91%. HR in 130s. BP stable.

## 2020-02-20 NOTE — ED NOTES
Bed: 02  Expected date:   Expected time:   Means of arrival:   Comments:  gama Melo RN  02/19/20 8280
Daughter at bedside.      Kasi Nielson RN  02/19/20 2021
Dr. Morgan Home as been in contact with family from [place] [id number].      Jannet Clinton RN  02/19/20 4834
Family Damir Nova at bedside      [facility], [address] [place] [address]  02/19/20 6983 [facility], [address] [place] [address]  02/19/20 8873
Per Dr. Abhijeet Sage, Chest CT on Pt. Will discontinue abdominal CT.       Chava Sanches RN  02/19/20 3782
Pt. Has rosary around neck. And silver like ring with opal like stone on pinky finger.        Yasmine Correia RN  02/19/20 9607
Quinonez in place, still no urine output. O2 stat 87 without consistent pleth wave. Non rebreather mask on 15 liters O2.      Radha Avelar RN  02/19/20 9212
Respiratory at bedside to check on pt. Advised she will be admitted.        Kevin Ross RN  02/19/20 1305
mepilex place on pt coccyx, no open areas      Maryanne Bound, RN  02/19/20 7881
senna (SENOKOT) 8.6 MG tablet Take 1 tablet by mouth 2 times daily as needed for Constipation      mirtazapine (REMERON) 7.5 MG tablet Take 7.5 mg by mouth nightly      acetaminophen (TYLENOL) 500 MG tablet Take 500 mg by mouth every 6 hours as needed for Pain      traZODone (DESYREL) 50 MG tablet Take 50 mg by mouth nightly      buPROPion (WELLBUTRIN XL) 300 MG XL tablet Take 300 mg by mouth every morning      clonazePAM (KLONOPIN) 0.5 MG tablet Take 0.5 mg by mouth nightly. [DISCONTINUED] apixaban (ELIQUIS) 2.5 MG TABS tablet Take by mouth 2 times daily      [DISCONTINUED] amLODIPine (NORVASC) 10 MG tablet Take 10 mg by mouth daily      [DISCONTINUED] gabapentin (NEURONTIN) 100 MG capsule Take 100 mg by mouth 3 times daily. [DISCONTINUED] furosemide (LASIX) 20 MG tablet Take 20 mg by mouth daily. [DISCONTINUED] Nortriptyline HCl (NORTRIPYTLINE HCL PO) Take 25 mg by mouth 2 times daily. 2 every night at bedtime      [DISCONTINUED] oxyCODONE-acetaminophen (PERCOCET) 5-325 MG per tablet Take 1 tablet by mouth every 6 hours as needed for Pain. [DISCONTINUED] amoxicillin (AMOXIL) 875 MG tablet Take 875 mg by mouth 2 times daily. [DISCONTINUED] meloxicam (MOBIC) 15 MG tablet Take 15 mg by mouth daily. [DISCONTINUED] citalopram (CELEXA) 10 MG tablet Take 10 mg by mouth daily. [DISCONTINUED] benazepril-hydrochlorthiazide (LOTENSIN HCT) 10-12.5 MG per tablet Take 1 tablet by mouth daily.

## 2020-02-20 NOTE — PROGRESS NOTES
Occupational/Physical Therapy  Odilia Ch    OT/PT orders received. Pt admitted from [facility] for AMS and CVA. Pt currently not medically appropriate for therapy evaluations at this time. Palliative care consult placed this AM. Will f/u with pt later this date if pt is medically appropriate.     Thanks,  Christal Gramajo, [facility], OTR/L [id number]  Carol Leo [place], DPT [id number]

## 2020-02-21 PROBLEM — N39.0 UTI (URINARY TRACT INFECTION): Status: ACTIVE | Noted: 2020-01-01

## 2020-02-21 PROBLEM — E87.20 LACTIC ACIDOSIS: Status: ACTIVE | Noted: 2020-01-01

## 2020-02-21 PROBLEM — J96.01 ACUTE RESPIRATORY FAILURE WITH HYPOXIA (HCC): Status: ACTIVE | Noted: 2020-01-01

## 2020-02-21 PROBLEM — A41.9 SEPSIS (HCC): Status: ACTIVE | Noted: 2020-01-01

## 2020-02-21 PROBLEM — E87.5 HYPERKALEMIA: Status: ACTIVE | Noted: 2020-01-01

## 2020-02-21 PROBLEM — N17.9 ACUTE RENAL FAILURE (ARF) (HCC): Status: ACTIVE | Noted: 2020-01-01

## 2020-02-21 NOTE — PLAN OF CARE
Problem: Falls - Risk of:  Goal: Will remain free from falls  Description  Will remain free from falls  Outcome: Ongoing  Goal: Absence of physical injury  Description  Absence of physical injury  Outcome: Ongoing   Fall precautions in place. Problem: Risk for Impaired Skin Integrity  Goal: Tissue integrity - skin and mucous membranes  Description  Structural intactness and normal physiological function of skin and  mucous membranes. Outcome: Ongoing   Patient turned and repositioned frequently. Problem: Confusion - Acute:  Goal: Absence of continued neurological deterioration signs and symptoms  Description  Absence of continued neurological deterioration signs and symptoms  Outcome: Ongoing  Goal: Mental status will be restored to baseline  Description  Mental status will be restored to baseline  Outcome: Ongoing   Patient not responding. Unable to assess. Problem: Discharge Planning:  Goal: Ability to perform activities of daily living will improve  Description  Ability to perform activities of daily living will improve  Outcome: Ongoing  Goal: Participates in care planning  Description  Participates in care planning  Outcome: Ongoing   Discharge planning in progress. Problem: Injury - Risk of, Physical Injury:  Goal: Will remain free from falls  Description  Will remain free from falls  Outcome: Ongoing  Goal: Absence of physical injury  Description  Absence of physical injury  Outcome: Ongoing   Safety precautions in place.     Problem: Mood - Altered:  Goal: Mood stable  Description  Mood stable  Outcome: Ongoing  Goal: Absence of abusive behavior  Description  Absence of abusive behavior  Outcome: Ongoing  Goal: Verbalizations of feeling emotionally comfortable while being cared for will increase  Description  Verbalizations of feeling emotionally comfortable while being cared for will increase  Outcome: Ongoing     Problem: Psychomotor Activity - Altered:  Goal: Absence of psychomotor self-care needs will improve  Description  Ability to meet self-care needs will improve  Outcome: Ongoing   Patient opening eyes to voice. Calm and resting with eyes closed. Problem: Pain:  Description  Pain management should include both nonpharmacologic and pharmacologic interventions. Goal: Pain level will decrease  Description  Pain level will decrease  Outcome: Ongoing  Goal: Control of acute pain  Description  Control of acute pain  Outcome: Ongoing  Goal: Control of chronic pain  Description  Control of chronic pain  Outcome: Ongoing   PRN pain medication given per order. The care plan and education has been reviewed and mutually agreed upon with the patient.

## 2020-02-21 NOTE — CONSULTS
encephalopathy  Unable to assess language. Cranial Nerves:   II: Visual fields: NT due to confusion. Pupils: equal, round, reactive to light  III,IV,VI: Extra Ocular Movements are intact. No nystagmus  V: Facial sensation : NT due to confusion  VII: Facial strength and movements: intact and symmetric  VIII: Hearing: NT due to confusion  IX: Palate elevation NT due to confusion  XI: Shoulder shrug: NT due to confusion  XII: Tongue movements: NT due to confusion  Musculoskeletal: She can withdraw to pain from left and right. Seems to be symmetric. Reflexes were 1+ throughout. Tone: Normal tone. No rigidity. Planters: flexor bilaterally. Coordination: NT due to confusion  Sensation: NT due to confusion  Gait/Posture: NT due to confusion    Data:  LABS:   Lab Results   Component Value Date     02/21/2020    K 5.1 02/21/2020    K 5.4 02/20/2020     02/21/2020    CO2 17 02/21/2020    BUN 58 02/21/2020    CREATININE 2.8 02/21/2020    GFRAA 19 02/21/2020    GFRAA >60 03/29/2013    LABGLOM 16 02/21/2020    GLUCOSE 170 02/21/2020    CALCIUM 8.1 02/21/2020     Lab Results   Component Value Date    WBC 14.7 02/20/2020    RBC 4.05 02/20/2020    HGB 12.1 02/20/2020    HCT 39.1 02/20/2020    MCV 96.6 02/20/2020    RDW 18.5 02/20/2020     02/20/2020     Lab Results   Component Value Date    INR 0.93 11/01/2014    PROTIME 10.0 11/01/2014       Neuroimaging were independently reviewed by myself and DW son. Reviewed notes from different physicians  Reviewed lab and blood testing    Impression:  Acute encephalopathy, severe. Possible acute metabolic encephalopathy versus new ischemic left hemispheric CVA. Her son is considering comfort measures at this point. Acute metabolic encephalopathy  Acute on chronic kidney disease  UTI  Dementia of Alzheimer disease, severe  Hypertension    Recommendation:  Long discussion with the patient's son today regarding prognosis and outcome.   I suggested MRI of the brain to exclude the possibility of left hemispheric CVA given her encephalopathy today. Her son however would like to wait on such testing at this time. Prognosis is guarded  Continue hydration  Antibiotics  Aspiration precautions  Blood pressure monitor  Neurochecks  Telemetry  DVT and GI prophylaxis  Insulin sliding scale  Blood sugar monitor  Consider palliative consultation if encephalopathy does not improve over the next 24 to 48 hours. Discussed with her nurse. We will follow PRN. Thank you for referring such patient. If you have any questions regarding my consult note, please don't hesitate to call me. Tyree Flores MD  [phone]    This dictation was generated by voice recognition computer software.  Although all attempts are made to edit the dictation for accuracy, there may be errors in the  transcription that are not intended

## 2020-02-21 NOTE — PROGRESS NOTES
Patient Active Problem List   Diagnosis    Status post THR (total hip replacement)    HTN (hypertension)    Acute cerebral infarction ([facility] [address] [facility]    Acute renal failure (ARF) (HCC)    Sepsis ([facility] [address] [facility]    UTI (urinary tract infection)    Hyperkalemia    Acute respiratory failure with hypoxia (HCC)    Lactic acidosis   h&p dictated

## 2020-02-21 NOTE — PROGRESS NOTES
Admission and assessment completed. Unable to complete NIHHS due to patient condition at this time. Last BP 64/53, oxygen 72% on 15L non re-breather. Patient appears to be resting comfortably. Daughter at bedside. Daughter asking lots of questions, trying understand what is going on. All questions answered at this time.

## 2020-02-21 NOTE — CARE COORDINATION
Patient has suspected deep tissue injury to sacrum. Discussed case with nurse Simon Peers. Nurse reports patient is actively dying and may go to hospice. Sacral border in place. Pt has inpatient hospice consult and has been seen by palliative care. Wound care orders placed.    Sobia Couch RN

## 2020-02-21 NOTE — H&P
[place] [address]                     [facility], [address]                              HISTORY AND PHYSICAL    PATIENT NAME: Ramiro Covington                       :        1930  MED REC NO:   [id number]                          ROOM:       5901  ACCOUNT NO:   [de-identified]                           ADMIT DATE: 2020  PROVIDER:     Jose Casas MD    HISTORY OF PRESENT ILLNESS:  The patient is an 43-year-old white woman  institutionalized because of dementia, came from [facility] in a poor responsive state. On arrival, the patient  underwent CT scan of the head that clearly revealed acute left  parietal-cerebral infarction. The patient is also DNR comfort care. The patient was extremely hypotensive and lethargic, unable to give any  history, does not verbalize anything. She is already hard of hearing to  begin with. PAST MEDICAL HISTORY:  Past medical history was gathered by the  relatives is pertinent for alcoholism, dementia, hypertension,  depression, anemia, and osteoarthritis. PAST SURGICAL HISTORY:  Pertinent for total hip arthroplasty, cataract  removal.    FAMILY HISTORY:  Both her parents are  because of natural  causes. No further history available. SOCIAL HISTORY:  The patient is a . She has six grown children,  few grandchildren. 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 There is no history of smoking, no history of drug  abuse. She is presently institutionalized for jail care. CURRENT MEDICATIONS:  The patient is on trazodone, Senokot, oxycodone,  Remeron, Lopressor, Calmoseptine, Advil, gabapentin, Duragesic, Lexapro,  Klonopin, Zyrtec, Wellbutrin, Dulcolax, aspirin, Maalox, and Tylenol. ALLERGIES:  The patient has no known allergies.     REVIEW OF SYSTEMS:  Pertinent for the patient being semicomatose,  moaning and groaning, does not verbalize, hiatal hernia,  moderate cardiomegaly. Suspected acute left parietal lobe ischemic  infarct. Follow up MRI can be considered, although I do not believe it  will change the patient's treatment. ASSESSMENT:  1. Acute cerebral infarction. 2.  Acute respiratory failure. 3.  Acute renal failure. 4.  Urinary tract infection. 5.  Dementia. 6.  Hypertension. PLAN:  Get her admitted. Treat her with IV hydration, IV Zosyn,  nebulized aerosol supplemental oxygen. The patient is DNR comfort care. We will also get a Neurology consultation. At this time, I would very  highly recommend we consider palliative care in this patient.         Lavern Sanchez MD    D: 02/21/2020 8:38:41       T: 02/21/2020 10:43:02     [email or web address]  Job#: [id number]     Doc#: [id number]    CC:  [employer]

## 2020-02-21 NOTE — PROGRESS NOTES
Physical/Occupational Therapy  Odilia Ch  PT/OT orders noted and appreciated. Per RN, Eloisa Cotter, pt remains poorly responsive with fluctuating O2 saturations on 15L nonrebreather. As such, RN states pt not appropriate for therapy evaluations at this time. Discussed with RN. Noted palliative care consult. RN and PT/OT agreeing on discharge from therapy caseloads at this time, with plans to re-order therapy evaluations pending improvement/decisions regarding POC. PT/OT will therefore discontinue orders at this time. Please re-order therapy evaluations should pt status warrant.   Thank you,  Ute De Anda, PT, DPT, [id number]  Jus Haider, OTR/L #[id number]

## 2020-02-21 NOTE — PROGRESS NOTES
Reassessment completed. Patient will open eyes to voice then close them right back. BP up to 93/62,  and oxygen 82% on 15L non re-breather. Patient appears to be getting fidgety in bed. Will continue to monitor.

## 2020-02-21 NOTE — PROGRESS NOTES
Patient's son and daughters spoke with Dr. Latha Hansen on the phone. They are considering hospice and are thinking about [facility].

## 2020-02-22 PROBLEM — I63.522 ACUTE ARTERIAL ISCHEMIC STROKE, MULTIFOCAL, ANTERIOR CIRCULATION, LEFT (HCC): Status: ACTIVE | Noted: 2020-01-01

## 2020-02-22 NOTE — PLAN OF CARE
Problem: Falls - Risk of:  Goal: Will remain free from falls  Description  Will remain free from falls  2/22/2020 0037 by Pennie Luna RN  Outcome: Ongoing  Note:   High fall risk. Patient remains free from falls. Patient encouraged to use call light with any needs. Call light in reach, bed alarm on.     Goal: Absence of physical injury  Description  Absence of physical injury  Outcome: Ongoing

## 2020-02-22 NOTE — PLAN OF CARE
Problem: Falls - Risk of:  Goal: Will remain free from falls  Description  Will remain free from falls  2/22/2020 0851 by Noa Muñoz RN  Outcome: Ongoing  2/22/2020 0037 by Renu Jackson RN  Outcome: Ongoing     Problem: Risk for Impaired Skin Integrity  Goal: Tissue integrity - skin and mucous membranes  Description  Structural intactness and normal physiological function of skin and  mucous membranes.   2/22/2020 0851 by Noa Muñoz RN  Outcome: Ongoing  2/22/2020 0037 by Renu Jackson RN  Outcome: Ongoing     Problem: Confusion - Acute:  Goal: Absence of continued neurological deterioration signs and symptoms  Description  Absence of continued neurological deterioration signs and symptoms  2/22/2020 0851 by Noa Muñoz RN  Outcome: Ongoing  2/22/2020 0037 by Renu Jackson RN  Outcome: Ongoing     Problem: Discharge Planning:  Goal: Ability to perform activities of daily living will improve  Description  Ability to perform activities of daily living will improve  2/22/2020 0851 by Noa Muñoz RN  Outcome: Ongoing  2/22/2020 0037 by Renu Jackson RN  Outcome: Ongoing     Problem: Injury - Risk of, Physical Injury:  Goal: Will remain free from falls  Description  Will remain free from falls  2/22/2020 0851 by Noa Muñoz RN  Outcome: Ongoing  2/22/2020 0037 by Renu Jackson RN  Outcome: Ongoing

## 2020-02-22 NOTE — DISCHARGE INSTR - COC
Continuity of Care Form    Patient Name: Tracy Cloud   :  1930  MRN:  [id number]    Admit date:  2020  Discharge date:  2020    Code Status Order: [facility]   Advance Directives:   Advance Care Flowsheet Documentation     Date/Time Healthcare Directive Type of Healthcare Directive Copy in [address] Agent's Name Healthcare Agent's Phone Number    20   Yes, patient has an advance directive for healthcare treatment  Durable power of  for health care;Living will  Yes, copy in chart  Healthcare power of   [facility]  [phone]    20 1201  Yes, patient has an advance directive for healthcare treatment  Durable power of  for health care  No, copy requested from family  Healthcare power of   [facility]  21           Admitting Physician:  Connor Degroot MD  PCP: Mahesh Mcadams MD    Discharging Nurse: [facility] Unit/Room#: X0B-1367/5766-71  Discharging Unit Phone Number: ***    Emergency Contact:   Extended Emergency Contact Information  Primary Emergency Contact: Alen [address] Phone: [phone]  Relation: Child  Secondary Emergency Contact: [facility] Phone: [phone]  Relation: Child    Past Surgical History:  Past Surgical History:   Procedure Laterality Date    CATARACT REMOVAL Bilateral 2012    TOTAL HIP ARTHROPLASTY Left 11/10/2008    TOTAL HIP ARTHROPLASTY Right 3/25/2013    Dr Marybeth Joyce       Immunization History: There is no immunization history on file for this patient.     Active Problems:  Patient Active Problem List   Diagnosis Code    Status post THR (total hip replacement) Z96.649    HTN (hypertension) I10    Acute cerebral infarction (HCC) I63.9    Acute renal failure (ARF) (HCC) N17.9    Sepsis ([address] A41.9    UTI (urinary tract infection) N39.0    Hyperkalemia E87.5    Acute respiratory failure with

## 2020-02-22 NOTE — PROGRESS NOTES
PRN  aspirin chewable tablet 81 mg, 81 mg, Oral, Daily  bisacodyl (DULCOLAX) suppository 10 mg, 10 mg, Rectal, Daily PRN  buPROPion (WELLBUTRIN XL) extended release tablet 300 mg, 300 mg, Oral, QAM  cetirizine (ZYRTEC) tablet 10 mg, 10 mg, Oral, Daily  menthol-zinc oxide (CALMOSEPTINE) 0.44-20.6 % ointment, , Topical, BID PRN  mirtazapine (REMERON) tablet 7.5 mg, 7.5 mg, Oral, Nightly  senna (SENOKOT) tablet 8.6 mg, 1 tablet, Oral, Nightly  traZODone (DESYREL) tablet 50 mg, 50 mg, Oral, Nightly  sodium chloride flush 0.9 % injection 10 mL, 10 mL, Intravenous, 2 times per day  sodium chloride flush 0.9 % injection 10 mL, 10 mL, Intravenous, PRN  polyethylene glycol (GLYCOLAX) packet 17 g, 17 g, Oral, Daily PRN  promethazine (PHENERGAN) tablet 12.5 mg, 12.5 mg, Oral, Q6H PRN  ondansetron (ZOFRAN) injection 4 mg, 4 mg, Intravenous, Q6H PRN  atorvastatin (LIPITOR) tablet 40 mg, 40 mg, Oral, Nightly  [DISCONTINUED] aspirin EC tablet 81 mg, 81 mg, Oral, Daily **OR** aspirin suppository 300 mg, 300 mg, Rectal, Daily  labetalol (NORMODYNE;TRANDATE) injection 10 mg, 10 mg, Intravenous, Q10 Min PRN    Physical      Vitals: BP 93/64   Pulse 149   Temp 97.6 °F (36.4 °C) (Temporal)   Resp 26   Ht 5' (1.524 m)   Wt 143 lb (64.9 kg)   SpO2 (!) 80%   BMI 27.93 kg/m²   Temp: Temp: 97.6 °F (36.4 °C)  Max: Temp  Av.4 °F (36.9 °C)  Min: 97.6 °F (36.4 °C)  Max: 99 °F (37.2 °C)  Respiration range:  Resp  Av.4  Min: 22  Max: 28  Pulse Range:  Pulse  Av  Min: 121  Max: 149  Blood pressure range:  Systolic (41EAY), OCD:740 , Min:90 , WQX:784   , Diastolic (03TJL), QSD:43, Min:58, Max:89    SpO2  Av.4 %  Min: 80 %  Max: 94 %    Intake/Output Summary (Last 24 hours) at 2020 2308  Last data filed at 2020 2100  Gross per 24 hour   Intake 920 ml   Output 250 ml   Net 670 ml       Vent settings:  Pulse  Av.7  Min: 93  Max: 149  Resp  Av.6  Min: 16  Max: 28  SpO2  Av.1 %  Min: 59 %  Max: 94 %    CONSTITUTIONAL:  Fatigued, semicomatose  somnolent, unarousable, uncooperative, distracted, severe distress and appears stated age  EYES:  Unremarkable   ENT:  No JVD   NECK:  supple, symmetrical, trachea midline  BACK:  symmetric  LUNGS:  tachypneic, Moderate respiratory distress, decreased air exchange, no retractions and crackles diffuse, rhonchi diffuse  CARDIOVASCULAR:  normal apical pulses, regular rate and rhythm, normal S1 and S2 and no S3  ABDOMEN:  Soft BS + non tender   MUSCULOSKELETAL:  Trace edema   NEUROLOGIC:  Functional quadriplegia  SKIN:  Cold and clammy  and no bruising or bleeding    Data      Lab Results   Component Value Date    WBC 14.7 (H) 02/20/2020    HGB 12.1 02/20/2020    HCT 39.1 02/20/2020    MCV 96.6 02/20/2020     02/20/2020     Lab Results   Component Value Date     02/21/2020    K 5.1 02/21/2020    K 5.4 02/20/2020     02/21/2020    CO2 17 02/21/2020    BUN 58 02/21/2020    CREATININE 2.8 02/21/2020    GLUCOSE 170 02/21/2020    CALCIUM 8.1 02/21/2020          ASSESSMENT AND PLAN     Active Problems:    HTN (hypertension)    Acute cerebral infarction (HCC)    Acute renal failure (ARF) (HCC)    Sepsis (HCC)    UTI (urinary tract infection)    Hyperkalemia    Acute respiratory failure with hypoxia (HCC)    Lactic acidosis    Cerebrovascular accident (CVA) ([facility] [address]    Acute encephalopathy    Encephalopathy, metabolic    YURI (acute kidney injury) ([facility] [address]    Dementia due to Alzheimer's disease ([facility] [address]  Resolved Problems:    * No resolved hospital problems.  *    Worsening kidney failure and lack of neurologic recovery  And sepsis are all poor prognostic indicators family is accepting hospice as level of care  And meeting with hospice tomorrow   She is DNR CC , will maximize comfort measures  Patient on Roxanol and ativan intensol

## 2020-02-22 NOTE — PROGRESS NOTES
Notified Dr. Misty Zamarripa of patient's family wishes to have patient stay in hospital under hospice care. Hospice RN stated we will have to d/c patient and readmit her under hospice care. Will await new orders.

## 2020-02-25 LAB
CULTURE, BLOOD 2: ABNORMAL
CULTURE, BLOOD 2: ABNORMAL
ORGANISM: ABNORMAL

## 2020-03-26 NOTE — PROGRESS NOTES
Patient seen , discharge dictated scripts given , arrangements made , FLOYD completed .  Discussed with nursing staff  And   If applicable ,  Discussed with  Patient's family , all questions answered and concerns addressed  When applicable

## 2020-03-27 NOTE — DISCHARGE SUMMARY
evaluation and Speech  recommendation. The patient was kept n.p.o. The patient was continued  on palliative _____ care. Necessary arrangements were made for the  patient to go to hospice service. The patient remained comatose till  the end. The patient  in stable condition. The patient   without any distress. Her body was for organ donation to the [facility]. Necessary arrangements were made for  final disposition of the body.         Luiz Xie MD    D: 2020 10:59:35       T: 2020 12:52:01     [id number]  Job#: [id number]     Doc#: [id number]    CC: